# Patient Record
Sex: MALE | Race: AMERICAN INDIAN OR ALASKA NATIVE
[De-identification: names, ages, dates, MRNs, and addresses within clinical notes are randomized per-mention and may not be internally consistent; named-entity substitution may affect disease eponyms.]

---

## 2019-11-22 NOTE — XRAY REPORT
CHEST 2 VIEWS 



INDICATION: 

Cough and IAN.



COMPARISON: 

None.



FINDINGS:

Support devices: None.



Heart: Mild cardiomegaly. 

Lungs/Pleura: Localized consolidation right base.   No significant pleural effusion. 



IMPRESSION:  Right basilar pneumonia.



Signer Name: Iker Oquendo MD 

Signed: 11/22/2019 1:04 AM

 Workstation Name: [id number]

## 2019-11-22 NOTE — EMERGENCY DEPARTMENT REPORT
- General


Chief Complaint: Dyspnea/Respdistress


Stated Complaint: SOB


Time Seen by Provider: 19 00:46


Source: patient


Mode of arrival: Ambulatory


Limitations: No Limitations





- History of Present Illness


Initial Comments: 





This pleasant 44-year-old male present emergency department with a chief 

complaint of cough, congestion and shortness of breath the past 2 days.  Patient

reports a past medical history of hypertension but is not currently name 

medications.  He reports history of tobacco use and smokes about a pack a day 

for the past 10 years.  Patient reports he has had a productive cough and 

tightness in his chest as well as subjective fever at home.  Patient denies any 

dyspnea on exertion or chest pain on exertion, pleuritic pain, lower extremity 

edema, recent travel, sick contacts, nausea, vomiting, diarrhea, or any 

associated symptoms.  Patient denies any pain.


MD Complaint: fever, cough


Onset/Timing: 3


-: days(s)


Severity: mild


Severity scale (0 -10): 0


Consistency: intermittent


Improves With: nothing


Worsens With: activity


Associated Symptoms: denies other symptoms.  denies: myalgias, diaphoresis, 

headache, rhinorrhea, nasal congestion, sore throat, stiff neck, chest pain, 

abdominal pain, nausea, vomiting, diarrhea, dysuria, rash, confusion, right 

sweats, epistaxis, hoarseness


Treatments Prior to Arrival: none





- Related Data


                                  Previous Rx's











 Medication  Instructions  Recorded  Last Taken  Type


 


ALBUTEROL Inhaler (OR & NICU) 2 puff IH QID PRN #8.5 gram 19 Unknown Rx





[ProAir HFA Inhaler]    


 


levoFLOXacin [Levaquin] 750 mg PO QDAY #5 tablet 19 Unknown Rx


 


methylPREDNISolone [Medrol 4MG 4 mg PO ONCE #1 tab.ds.pk 19 Unknown Rx





DOSEPAK (21 tabs)]    











                                    Allergies











Allergy/AdvReac Type Severity Reaction Status Date / Time


 


No Known Allergies Allergy   Unverified 19 00:16














ED Review of Systems


ROS: 


Stated complaint: SOB


Other details as noted in HPI





Comment: All other systems reviewed and negative


Constitutional: see HPI, fever.  denies: chills


Eyes: denies: eye pain, eye discharge, vision change


ENT: denies: ear pain, throat pain


Respiratory: see HPI, cough, wheezing.  denies: orthopnea, shortness of breath, 

SOB with exertion, SOB at rest


Cardiovascular: denies: chest pain, palpitations


Endocrine: no symptoms reported


Gastrointestinal: denies: abdominal pain, nausea, diarrhea


Genitourinary: denies: urgency, dysuria


Musculoskeletal: denies: back pain, joint swelling, arthralgia


Skin: denies: rash, lesions


Neurological: denies: headache, weakness, paresthesias


Psychiatric: denies: anxiety, depression


Hematological/Lymphatic: denies: easy bleeding, easy bruising





ED Past Medical Hx





- Past Medical History


Previous Medical History?: Yes


Hx Hypertension: Yes





- Surgical History


Past Surgical History?: No





- Social History


Smoking Status: Current Every Day Smoker


Substance Use Type: None





- Medications


Home Medications: 


                                Home Medications











 Medication  Instructions  Recorded  Confirmed  Last Taken  Type


 


ALBUTEROL Inhaler (OR & NICU) 2 puff IH QID PRN #8.5 gram 19  Unknown Rx





[ProAir HFA Inhaler]     


 


levoFLOXacin [Levaquin] 750 mg PO QDAY #5 tablet 19  Unknown Rx


 


methylPREDNISolone [Medrol 4MG 4 mg PO ONCE #1 tab.ds.pk 19  Unknown Rx





DOSEPAK (21 tabs)]     














ED Physical Exam





- General


Limitations: No Limitations


General appearance: alert, in no apparent distress





- Head


Head exam: Present: atraumatic, normocephalic





- Eye


Eye exam: Present: normal appearance, PERRL, EOMI


Pupils: Present: normal accommodation





- ENT


ENT exam: Present: normal exam, normal orophraynx, mucous membranes moist, TM's 

normal bilaterally





- Neck


Neck exam: Present: normal inspection, full ROM.  Absent: tenderness, 

meningismus





- Respiratory


Respiratory exam: Present: normal lung sounds bilaterally, wheezes (mild 

expiratory wheezes bilaterally with no accessory muscle use or increased work of

breathing).  Absent: respiratory distress, rales, rhonchi, stridor





- Cardiovascular


Cardiovascular Exam: Present: regular rate, normal rhythm, normal heart sounds. 

Absent: systolic murmur, diastolic murmur, rubs, gallop





- GI/Abdominal


GI/Abdominal exam: Present: soft, normal bowel sounds.  Absent: tenderness, 

guarding, rebound, rigid





- Rectal


Rectal exam: Present: deferred





- Extremities Exam


Extremities exam: Present: normal inspection, full ROM, other (negative Homans 

sign bilaterally, no posterior calf tenderness or palpable cords.).  Absent: 

tenderness, calf tenderness





- Back Exam


Back exam: Present: normal inspection, full ROM.  Absent: tenderness, CVA 

tenderness (R), CVA tenderness (L)





- Neurological Exam


Neurological exam: Present: alert, oriented X3, normal gait.  Absent: motor 

sensory deficit





- Psychiatric


Psychiatric exam: Present: normal affect, normal mood





- Skin


Skin exam: Present: warm, dry, intact, normal color.  Absent: rash





ED Course





                                   Vital Signs











  19





  00:09


 


Temperature 98.2 F


 


Pulse Rate 78


 


Respiratory 20





Rate 


 


Blood Pressure 169/108





[Left] 


 


O2 Sat by Pulse 96





Oximetry 














ED Medical Decision Making





- Radiology Data


Radiology results: report reviewed, image reviewed





XRay Report


Signed





Patient: NATY OLIVAREZ MR#: [id number]





: 1975 Acct:[id number]





Age/Sex: 44 / M ADM Date: 19





Loc: ED


Attending Dr:








Ordering Physician: LATRICE AZPATA


Date of Service: 19


Procedure(s): XR chest routine 2V


Accession Number(s): [id number]





cc: LATRICE ZAPATA





Fluoro Time In Minutes:





CHEST 2 VIEWS





INDICATION:


Cough and IAN.





COMPARISON:


None.





FINDINGS:


Support devices: None.





Heart: Mild cardiomegaly.


Lungs/Pleura: Localized consolidation right base. No significant pleural 

effusion.





IMPRESSION: Right basilar pneumonia.





Signer Name: Iker Oquendo MD


Signed: 2019 1:04 AM


Workstation Name: [id number]








Transcribed By: ES


Dictated By: Iker Oquendo MD


Electronically Authenticated By: Iker Oquendo MD


Signed Date/Time: 19 0104








- Medical Decision Making





Patient is nontoxic in no acute distress.  Vitals are stable with a pulse ox of 

96 and no tachycardia.  Patient is PERC negative and low risk for PE by [facility] 

criteria.  He had wheezing on exam and was given a breathing treatment and 

steroid cream or department and his symptoms significantly improved.  Wheezing 

resolved after reevaluation.  X-ray was ordered and showed a right basilar 

pneumonia.  Patient is otherwise young and healthy and I will treat him with 

Levaquin 750 by mouth daily 5 days.  First dose was given in emergency 

department.  He was instructed to follow-up with his primary care doctor and 

return for spreading changing worsening symptoms.  Patient has no SIRS criteria 

and does not meet sepsis criteria.  He is well-appearing and agreeable with 

plan.  All questions were answered





- Differential Diagnosis


pneumonia, influenza, PE, viral respiratory infection, COPD exacerbation


Critical care attestation.: 


If time is entered above; I have spent that time in minutes in the direct care 

of this critically ill patient, excluding procedure time.








ED Disposition


Clinical Impression: 


CAP (community acquired pneumonia)


Qualifiers:


 Laterality: right Lung location: lower lobe of lung Qualified Code(s): J18.9 - 

Pneumonia, unspecified organism





Disposition:  TO HOME OR SELFCARE


Is pt being admited?: No


Does the pt Need Aspirin: No


Condition: Stable


Instructions:  Bacterial Pneumonia (ED)


Prescriptions: 


levoFLOXacin [Levaquin] 750 mg PO QDAY #5 tablet


methylPREDNISolone [Medrol 4MG DOSEPAK (21 tabs)] 4 mg PO ONCE #1 tab.ds.pk


ALBUTEROL Inhaler (OR & NICU) [ProAir HFA Inhaler] 2 puff IH QID PRN #8.5 gram


 PRN Reason: Shortness Of Breath


Referrals: 


PRIMARY CARE,MD [Primary Care Provider] - 3-5 Days


[facility] [Provider Group] - 3-5 Days


GUANAKO PALOMARES DO [Staff Physician] - 3-5 Days


Forms:  Work/School Release Form(ED)


Time of Disposition: 01:43

## 2019-11-30 NOTE — XRAY REPORT
CHEST 1 VIEW 



INDICATION / CLINICAL INFORMATION:

Dyspnea.



COMPARISON: 

None available.



FINDINGS:



SUPPORT DEVICES: None.



HEART / MEDIASTINUM: Mild cardiomegaly



LUNGS / PLEURA: Patchy bibasilar airspace density noted bilaterally. Small pleural effusions present.




Signer Name: Nicolas Giordano MD 

Signed: 11/30/2019 5:31 AM

 Workstation Name: [id number]

## 2019-11-30 NOTE — EMERGENCY DEPARTMENT REPORT
ED Shortness of Breath HPI





- General


Chief Complaint: Upper Respiratory Infection


Stated Complaint: COUGH IAN


Time Seen by Provider: 11/30/19 06:16


Source: patient


Mode of arrival: Ambulatory


Limitations: No Limitations





- History of Present Illness


Initial Comments: 


This is a 44-year-old man who denies previous history of hypertension.  I 

reviewed his previous vital signs upon his visit on 1120.  He had stage 2-3 

moderately severe hypertension.  He was treated for URI/questionable pneumonia. 

He states he's been short of breath throughout this time.  He has  dyspneia on 

exertion.  He does not complain of chest pain.  He has accelerated hypertension 

today.





MD Complaint: shortness of breath


-: Gradual


Severity: moderate (dyspnea denies chest)


Consistency: intermittent


Worsens With: lying flat


Context: recent URI


Associated Symptoms: denies other symptoms





- Related Data


                                Home Medications











 Medication  Instructions  Recorded  Confirmed  Last Taken


 


No Known Home Medications [No  11/30/19 11/30/19 Unknown





Reported Home Medications]    











                                    Allergies











Allergy/AdvReac Type Severity Reaction Status Date / Time


 


No Known Allergies Allergy   Unverified 11/22/19 00:16














ED Review of Systems


ROS: 


Stated complaint: COUGH IAN


Other details as noted in HPI





Constitutional: denies: chills, fever


Eyes: denies: eye pain, eye discharge, vision change


ENT: denies: ear pain, throat pain


Respiratory: shortness of breath.  denies: cough, wheezing


Cardiovascular: denies: chest pain, palpitations


Endocrine: no symptoms reported


Gastrointestinal: denies: abdominal pain, nausea, diarrhea


Genitourinary: denies: urgency, dysuria


Musculoskeletal: denies: back pain, joint swelling, arthralgia


Skin: denies: rash, lesions


Neurological: denies: headache, weakness, paresthesias


Psychiatric: denies: anxiety, depression


Hematological/Lymphatic: denies: easy bleeding, easy bruising





ED Past Medical Hx





- Past Medical History


Previous Medical History?: Yes


Hx Hypertension: Yes





- Surgical History


Past Surgical History?: No





- Social History


Smoking Status: Current Every Day Smoker





- Medications


Home Medications: 


                                Home Medications











 Medication  Instructions  Recorded  Confirmed  Last Taken  Type


 


No Known Home Medications [No  11/30/19 11/30/19 Unknown History





Reported Home Medications]     














ED Physical Exam





- General


Limitations: No Limitations


General appearance: alert, in no apparent distress





- Head


Head exam: Present: atraumatic, normocephalic





- Eye


Eye exam: Present: normal appearance.  Absent: scleral icterus





- ENT


ENT exam: Present: mucous membranes moist





- Neck


Neck exam: Present: normal inspection





- Respiratory


Respiratory exam: Present: rales (bilaterally).  Absent: respiratory distress





- Cardiovascular


Cardiovascular Exam: Present: regular rate, normal rhythm, S3, S4.  Absent: 

systolic murmur, diastolic murmur, rubs, gallop





- GI/Abdominal


GI/Abdominal exam: Present: soft, normal bowel sounds.  Absent: distended, 

tenderness, guarding, rebound, rigid





- Rectal


Rectal exam: Present: deferred





- Extremities Exam


Extremities exam: Present: other (1+ pretibial edema).  Absent: calf tenderness





- Back Exam


Back exam: Present: normal inspection





- Neurological Exam


Neurological exam: Present: alert, oriented X3, CN II-XII intact.  Absent: motor

sensory deficit





- Psychiatric


Psychiatric exam: Present: normal affect, normal mood





- Skin


Skin exam: Present: warm, dry, intact, normal color.  Absent: rash





ED Course


                                   Vital Signs











  11/30/19 11/30/19 11/30/19





  04:38 05:09 05:42


 


Temperature 97.8 F  98.3 F


 


Pulse Rate 87 84 85


 


Pulse Rate [   





Bilateral   





Throughout]   


 


Respiratory 20 23 16





Rate   


 


Respiratory   





Rate [Bilateral   





Throughout]   


 


Blood Pressure 186/124 174/118 


 


Blood Pressure   174/118





[Left]   


 


O2 Sat by Pulse 92 96 96





Oximetry   














  11/30/19 11/30/19 11/30/19





  05:55 06:00 06:31


 


Temperature   


 


Pulse Rate  79 84


 


Pulse Rate [ 85  





Bilateral   





Throughout]   


 


Respiratory  22 





Rate   


 


Respiratory 20  





Rate [Bilateral   





Throughout]   


 


Blood Pressure  172/121 157/116


 


Blood Pressure   





[Left]   


 


O2 Sat by Pulse  98 





Oximetry   














  11/30/19





  06:41


 


Temperature 


 


Pulse Rate 89


 


Pulse Rate [ 





Bilateral 





Throughout] 


 


Respiratory 





Rate 


 


Respiratory 





Rate [Bilateral 





Throughout] 


 


Blood Pressure 157/116


 


Blood Pressure 





[Left] 


 


O2 Sat by Pulse 





Oximetry 














ED Medical Decision Making





- Lab Data


Result diagrams: 


                                 11/30/19 05:35





                                 11/30/19 05:35





- EKG Data


-: EKG Interpreted by Me


EKG shows normal: sinus rhythm


Rate: normal





- EKG Data


Interpretation: nonspecific ST-T wave sunita, other (left axis deviation/LAFB and 

poor R-wave progression)





- Radiology Data


Radiology results: report reviewed (LUNGS / PLEURA: Patchy bibasilar airspace 

density noted bilaterally. Small pleural effusions), image reviewed


Critical care attestation.: 


If time is entered above; I have spent that time in minutes in the direct care 

of this critically ill patient, excluding procedure time.








ED Disposition


Clinical Impression: 


 Malignant hypertension, CHF (congestive heart failure)





Disposition: DC-09 OP ADMIT IP TO THIS HOSP


Is pt being admited?: Yes


Does the pt Need Aspirin: Yes


Condition: Stable


Instructions:  Hypertension (ED)


Referrals: 


PRIMARY CARE,MD [Primary Care Provider] - 3-5 Days


Time of Disposition: 07:31

## 2019-11-30 NOTE — HISTORY AND PHYSICAL REPORT
History of Present Illness


Date of examination: 11/30/19


Date of admission: 


11/30/19 07:37





Chief complaint: 


Shortness of breath on exertion for 4 days





History of present illness: 


44-year-old -American male with history of hypertension who is not taking

any medications comes in for increasing shortness of breath on exertion for last

3-4 days.  Patient was treated for questionable pneumonia and upper respiratory 

tract infection on 11/20/2019.  Patient was given antibiotics with no relief.  

In the emergency room patient's blood pressure was very high--186/124.Patient 

also has sob on minimal exertion and orthopnea.  Patient was diagnosed with high

blood pressure 10 months ago.  Patient very noncompliant and in denial about his

blood pressure.








Past Medical History


Previous Medical History?: Yes


Hx Hypertension: Yes





Surgical History


Past Surgical History?: No





Social History 


Smoking Status: Current Every Day Smoker 





Family history


Htn





Medications


Home Medications: 


                                Home Medications











 Medication  Instructions  Recorded  Confirmed  Last Taken  Type


 


No Known Home Medications [No  11/30/19 11/30/19 Unknown History





Reported Home Medications]     














Review of Systems


ROS: 


Stated complaint: COUGH IAN


Other details as noted in HPI





Constitutional: denies: chills, fever


Eyes: denies: eye pain, eye discharge, vision change


ENT: denies: ear pain, throat pain


Respiratory: shortness of breath.  denies: cough, wheezing


Cardiovascular: denies: chest pain, palpitations


Endocrine: no symptoms reported


Gastrointestinal: denies: abdominal pain, nausea, diarrhea


Genitourinary: denies: urgency, dysuria


Musculoskeletal: denies: back pain, joint swelling, arthralgia


Skin: denies: rash, lesions


Neurological: denies: headache, weakness, paresthesias


Psychiatric: denies: anxiety, depression


Hematological/Lymphatic: denies: easy bleeding, easy bruising

















Medications and Allergies


                                    Allergies











Allergy/AdvReac Type Severity Reaction Status Date / Time


 


No Known Allergies Allergy   Verified 11/30/19 12:30











                                Home Medications











 Medication  Instructions  Recorded  Confirmed  Last Taken  Type


 


Potassium Chloride [K-Dur] 10 meq PO QDAY #30 tablet 11/30/19  Unknown Rx


 


Valsartan [Diovan] 160 mg PO BID #60 tablet 11/30/19  Unknown Rx


 


carvediloL [Coreg] 6.25 mg PO BID #60 tablet 11/30/19  Unknown Rx


 


hydroCHLOROthiazide [HCTZ] 25 mg PO QDAY #30 tablet 11/30/19  Unknown Rx














Exam





- Constitutional


Vitals: 


                                        











Temp Pulse Resp BP Pulse Ox


 


 98.0 F   88   16   159/99   96 


 


 11/30/19 11:14  11/30/19 11:14  11/30/19 11:14  11/30/19 11:14  11/30/19 11:14











General appearance: Present: no acute distress, well-nourished





- EENT


Eyes: Present: PERRL


ENT: hearing intact, clear oral mucosa





- Neck


Neck: Present: supple, normal ROM





- Respiratory


Respiratory effort: normal


Respiratory: bilateral: CTA





- Cardiovascular


Heart rate: 86


Rhythm: regular


Heart Sounds: Present: S1 & S2.  Absent: rub, click





- Extremities


Extremities: no ischemia, pulses intact, pulses symmetrical, No edema


Peripheral Pulses: within normal limits





- Abdominal


General gastrointestinal: Present: soft, non-tender, non-distended, normal bowel

 sounds


Male genitourinary: Present: normal





- Rectal


Rectal Exam: deferred





- Integumentary


Integumentary: Present: clear, warm, dry





- Musculoskeletal


Musculoskeletal: gait normal, strength equal bilaterally





- Psychiatric


Psychiatric: appropriate mood/affect, intact judgment & insight





- Neurologic


Neurologic: CNII-XII intact, moves all extremities





Results





- Labs


CBC & Chem 7: 


                                 11/30/19 05:35





                                 11/30/19 05:35


Labs: 


                             Laboratory Last Values











WBC  6.8 K/mm3 (4.5-11.0)   11/30/19  05:35    


 


RBC  4.62 M/mm3 (3.65-5.03)   11/30/19  05:35    


 


Hgb  13.3 gm/dl (11.8-15.2)   11/30/19  05:35    


 


Hct  39.6 % (35.5-45.6)   11/30/19  05:35    


 


MCV  86 fl (84-94)   11/30/19  05:35    


 


MCH  29 pg (28-32)   11/30/19  05:35    


 


MCHC  34 % (32-34)   11/30/19  05:35    


 


RDW  15.1 % (13.2-15.2)   11/30/19  05:35    


 


Plt Count  230 K/mm3 (140-440)   11/30/19  05:35    


 


Lymph % (Auto)  24.1 % (13.4-35.0)   11/30/19  05:35    


 


Mono % (Auto)  6.7 % (0.0-7.3)   11/30/19  05:35    


 


Eos % (Auto)  3.1 % (0.0-4.3)   11/30/19  05:35    


 


Baso % (Auto)  0.5 % (0.0-1.8)   11/30/19  05:35    


 


Lymph #  1.6 K/mm3 (1.2-5.4)   11/30/19  05:35    


 


Mono #  0.5 K/mm3 (0.0-0.8)   11/30/19  05:35    


 


Eos #  0.2 K/mm3 (0.0-0.4)   11/30/19  05:35    


 


Baso #  0.0 K/mm3 (0.0-0.1)   11/30/19  05:35    


 


Seg Neutrophils %  65.6 % (40.0-70.0)   11/30/19  05:35    


 


Seg Neutrophils #  4.4 K/mm3 (1.8-7.7)   11/30/19  05:35    


 


PT  13.2 Sec. (12.2-14.9)   11/30/19  06:35    


 


INR  1.01  (0.87-1.13)   11/30/19  06:35    


 


APTT  30.7 Sec. (24.2-36.6)   11/30/19  06:35    


 


Sodium  142 mmol/L (137-145)   11/30/19  05:35    


 


Potassium  3.6 mmol/L (3.6-5.0)   11/30/19  05:35    


 


Chloride  106.3 mmol/L ()   11/30/19  05:35    


 


Carbon Dioxide  26 mmol/L (22-30)   11/30/19  05:35    


 


Anion Gap  13 mmol/L  11/30/19  05:35    


 


BUN  20 mg/dL (9-20)   11/30/19  05:35    


 


Creatinine  1.0 mg/dL (0.8-1.5)   11/30/19  05:35    


 


Estimated GFR  > 60 ml/min  11/30/19  05:35    


 


BUN/Creatinine Ratio  20 %  11/30/19  05:35    


 


Glucose  163 mg/dL ()  H  11/30/19  05:35    


 


Calcium  8.5 mg/dL (8.4-10.2)   11/30/19  05:35    


 


Total Bilirubin  0.40 mg/dL (0.1-1.2)   11/30/19  05:35    


 


AST  26 units/L (5-40)   11/30/19  05:35    


 


ALT  49 units/L (7-56)   11/30/19  05:35    


 


Alkaline Phosphatase  55 units/L ()   11/30/19  05:35    


 


Troponin T  0.015 ng/mL (0.00-0.029)   11/30/19  05:35    


 


NT-Pro-B Natriuret Pep  1055 pg/mL (0-450)  H  11/30/19  06:35    


 


Total Protein  6.2 g/dL (6.3-8.2)  L  11/30/19  05:35    


 


Albumin  3.8 g/dL (3.9-5)  L  11/30/19  05:35    


 


Albumin/Globulin Ratio  1.6 %  11/30/19  05:35    


 


Urine Color  Yellow  (Yellow)   11/30/19  08:16    


 


Urine Turbidity  Clear  (Clear)   11/30/19  08:16    


 


Urine pH  6.0  (5.0-7.0)   11/30/19  08:16    


 


Ur Specific Gravity  1.014  (1.003-1.030)   11/30/19  08:16    


 


Urine Protein  <15 mg/dl mg/dL (Negative)   11/30/19  08:16    


 


Urine Glucose (UA)  Neg mg/dL (Negative)   11/30/19  08:16    


 


Urine Ketones  Neg mg/dL (Negative)   11/30/19  08:16    


 


Urine Blood  Neg  (Negative)   11/30/19  08:16    


 


Urine Nitrite  Neg  (Negative)   11/30/19  08:16    


 


Urine Bilirubin  Neg  (Negative)   11/30/19  08:16    


 


Urine Urobilinogen  < 2.0 mg/dL (<2.0)   11/30/19  08:16    


 


Ur Leukocyte Esterase  Neg  (Negative)   11/30/19  08:16    


 


Urine WBC (Auto)  0.0 /HPF (0.0-6.0)   11/30/19  08:16    


 


Urine RBC (Auto)  1.0 /HPF (0.0-6.0)   11/30/19  08:16    


 


U Epithel Cells (Auto)  < 1.0 /HPF (0-13.0)   11/30/19  08:16    


 


Urine Opiates Screen  Presumptive negative   11/30/19  08:16    


 


Urine Methadone Screen  Presumptive negative   11/30/19  08:16    


 


Ur Barbiturates Screen  Presumptive negative   11/30/19  08:16    


 


Ur Phencyclidine Scrn  Presumptive negative   11/30/19  08:16    


 


Ur Amphetamines Screen  Presumptive negative   11/30/19  08:16    


 


U Benzodiazepines Scrn  Presumptive negative   11/30/19  08:16    


 


Urine Cocaine Screen  Presumptive negative   11/30/19  08:16    


 


U Marijuana (THC) Screen  Presumptive negative   11/30/19  08:16    


 


Drugs of Abuse Note  Disclamer   11/30/19  08:16    








                                    Short CBC











  11/30/19 Range/Units





  05:35 


 


WBC  6.8  (4.5-11.0)  K/mm3


 


Hgb  13.3  (11.8-15.2)  gm/dl


 


Hct  39.6  (35.5-45.6)  %


 


Plt Count  230  (140-440)  K/mm3








                                       BMP











  11/30/19





  05:35


 


Sodium  142


 


Potassium  3.6


 


Chloride  106.3


 


Carbon Dioxide  26


 


BUN  20


 


Creatinine  1.0


 


Glucose  163 H


 


Calcium  8.5








                                 Cardiac Enzymes











  11/30/19 Range/Units





  05:35 


 


Troponin T  0.015  (0.00-0.029)  ng/mL








                                 Liver Function











  11/30/19 Range/Units





  05:35 


 


Total Bilirubin  0.40  (0.1-1.2)  mg/dL


 


AST  26  (5-40)  units/L


 


ALT  49  (7-56)  units/L


 


Alkaline Phosphatase  55  ()  units/L


 


Albumin  3.8 L  (3.9-5)  g/dL








                                      Urine











  11/30/19 Range/Units





  08:16 


 


Urine Color  Yellow  (Yellow)  


 


Urine pH  6.0  (5.0-7.0)  


 


Ur Specific Gravity  1.014  (1.003-1.030)  


 


Urine Protein  <15 mg/dl  (Negative)  mg/dL


 


Urine Glucose (UA)  Neg  (Negative)  mg/dL














- Imaging and Cardiology


EKG: report reviewed ([facility])


Chest x-ray: report reviewed


Imaging and Cardiology: 





Chest x-ray





LUNGS / PLEURA: Patchy bibasilar airspace density noted bilaterally. Small 

pleural effusions


present.














Assessment and Plan


Advance Directives: Yes (Full code)


Plan of care discussed with patient/family: Yes





- Patient Problems


(1) Hypertensive emergency


Status: Acute   


Plan to address problem: 


Patient started on Valsartan coreg and IV Hydralazine 10 mg q3 prn irritable








(2) CHF exacerbation


Status: Acute   


Qualifiers: 


   Heart failure type: combined systolic and diastolic   Qualified Code(s): 

I50.43 - Acute on chronic combined systolic (congestive) and diastolic (conge

stive) heart failure   


Plan to address problem: 


ECHO ordered for EF


Clinically history and exam c/w  CHF 








(3) Nicotine dependence


Status: Chronic   


Qualifiers: 


   Nicotine product type: cigarettes 


Plan to address problem: 


Counselled about stopping smoking


Nicoderm patch initiated








(4) DVT prophylaxis


Status: Acute   


Plan to address problem: 


On Heparin 5000 q 12

## 2019-12-01 NOTE — DISCHARGE SUMMARY
Providers





- Providers


Date of Admission: 


11/30/19 07:37





Date of discharge: 11/30/19


Attending physician: 


AZUCENA SÁNCHEZ





Primary care physician: 


PRIMARY CARE MD








Hospitalization


Condition: Stable


Pertinent studies: 


Echocardiogram--systolic heart failure


Ejection fraction of 20-25%





Hospital course: 


(1) Hypertensive emergency


Status: Acute   


Plan to address problem: 


Patient started on Valsartan coreg and IV Hydralazine 10 mg q3 prn irritable








(2) CHF exacerbation


Status: Acute   


Qualifiers: 


   Heart failure type: combined systolic and diastolic   Qualified Code(s): 

I50.43 - Acute on chronic combined systolic (congestive) and diastolic 

(congestive) heart failure   


Plan to address problem: 


ECHO ordered for EF


Clinically history and exam c/w  CHF 








(3) Nicotine dependence


Status: Chronic   


Qualifiers: 


   Nicotine product type: cigarettes 


Plan to address problem: 


Counselled about stopping smoking


Nicoderm patch initiated








(4) DVT prophylaxis


Status: Acute   


Plan to address problem: 


On Heparin 5000 q 12 





Patient signed out AMA before I could give him his echocardiogram results.


Patient to follow at [facility]


Prescriptions for valsartan and Coreg given











Disposition: DC-07 LEFT AGAINST MED ADVICE





- Discharge Diagnoses


(1) Hypertensive emergency


Status: Acute   





(2) CHF exacerbation


Status: Acute   


Qualifiers: 


   Heart failure type: combined systolic and diastolic   Qualified Code(s): 

I50.43 - Acute on chronic combined systolic (congestive) and diastolic 

(congestive) heart failure   





(3) Nicotine dependence


Status: Chronic   


Qualifiers: 


   Nicotine product type: cigarettes 





(4) DVT prophylaxis


Status: Acute   





Core Measure Documentation





- Palliative Care


Palliative Care/ Comfort Measures: Not Applicable





- Core Measures


Any of the following diagnoses?: none





Exam





- Constitutional


Vitals: 


                                        











Temp Pulse Resp BP Pulse Ox


 


 99.3 F   80   24   157/101   96 


 


 11/30/19 11:37  11/30/19 16:22  11/30/19 11:37  11/30/19 16:22  11/30/19 11:37











General appearance: Present: no acute distress, well-nourished





- EENT


Eyes: Present: PERRL


ENT: hearing intact, clear oral mucosa





- Neck


Neck: Present: supple, normal ROM





- Respiratory


Respiratory effort: normal


Respiratory: bilateral: CTA





- Cardiovascular


Heart rate: 78


Rhythm: regular


Heart Sounds: Present: S1 & S2.  Absent: rub, click





- Extremities


Extremities: no ischemia, pulses intact, pulses symmetrical, No edema


Peripheral Pulses: within normal limits





- Abdominal


General gastrointestinal: Present: soft, non-tender, non-distended, normal bowel

sounds


Male genitourinary: Present: normal





- Rectal


Rectal Exam: deferred





- Integumentary


Integumentary: Present: clear, warm, dry





- Musculoskeletal


Musculoskeletal: gait normal, strength equal bilaterally





- Psychiatric


Psychiatric: appropriate mood/affect, intact judgment & insight





- Neurologic


Neurologic: CNII-XII intact, moves all extremities





- Allied Health


Allied health notes reviewed: nursing, case management





Plan


Activity: no restrictions


Diet: low fat, low cholesterol, low salt


Follow up with: 


PRIMARY CARE,MD [Primary Care Provider] - 3-5 Days


NICKOLAS RM MD [Staff Physician] - 7 Days


Prescriptions: 


carvediloL [Coreg] 6.25 mg PO BID #60 tablet


Valsartan [Diovan] 160 mg PO BID #60 tablet


hydroCHLOROthiazide [HCTZ] 25 mg PO QDAY #30 tablet


Potassium Chloride [K-Dur] 10 meq PO QDAY #30 tablet

## 2020-02-26 NOTE — XRAY REPORT
CHEST 2 VIEWS 



INDICATION / CLINICAL INFORMATION:

sob.



COMPARISON: 

One view of the chest from 11/30/2019.



FINDINGS:



SUPPORT DEVICES: None.

HEART / MEDIASTINUM: Cardiomegaly has worsened. 

LUNGS / PLEURA: Bilateral interstitial opacities likely represent edema/atelectasis. No significant p
leural effusion. No pneumothorax. 



ADDITIONAL FINDINGS: No significant additional findings.



IMPRESSION:

Interval worsening of cardia megaly with probable pulmonary edema/atelectasis.



Signer Name: Jose Guerra MD 

Signed: 2/26/2020 7:30 PM

 Workstation Name: [id number]

## 2020-02-26 NOTE — EMERGENCY DEPARTMENT REPORT
Minor Respiratory





- HPI


Duration: 4 Days


Pain Location: Other (Cough and shortness of breath)


Severity: moderate


Minor Respiratory: Yes Cough, Yes Shortness of Breath, No Rhinorrhea, No Sore 

Throat, No Able to Tolerate Fluids, No Ear Pain, No Sick Contacts, No 

Hemoptysis, No Chest Pain, No Fever


Other History: This is a 45-year-old -American male who presents to the 

emergency room with shortness of breath and a cough for 3 to 4 days.  Patient 

states he is currently not taking anything for symptomatic relief.  He reports 

shortness of breath is occasional.  Past medical history of hypertension.  

Current smoker.  He also reports myalgia with associated symptoms.  He denies 

nausea, vomiting, diarrhea, palpitations, weakness, headache.





<RON BECERRA - Last Filed: 02/27/20 00:42>





<FILEMON FERNANDEZ III - Last Filed: 02/27/20 01:07>





- HPI


Chief Complaint: Upper Respiratory Infection


Stated Complaint: CHEST PAIN


Time Seen by Provider: 02/26/20 21:58





ED Review of Systems


ROS: 


Stated complaint: CHEST PAIN


Other details as noted in HPI





Constitutional: denies: chills, fever


Respiratory: cough, SOB with exertion.  denies: shortness of breath, wheezing


Cardiovascular: denies: chest pain, palpitations


Gastrointestinal: denies: abdominal pain, nausea, diarrhea


Musculoskeletal: myalgia.  denies: back pain, joint swelling, arthralgia


Skin: denies: rash, lesions


Neurological: denies: headache, weakness, paresthesias


Psychiatric: denies: anxiety, depression





<RON BECERRA - Last Filed: 02/27/20 00:42>


ROS: 


Stated complaint: CHEST PAIN


Other details as noted in HPI








<FILEMON FERNANDEZ III - Last Filed: 02/27/20 01:07>





ED Past Medical Hx





- Past Medical History


Previous Medical History?: Yes


Hx Hypertension: Yes


Hx Asthma: No





- Social History


Smoking Status: Current Every Day Smoker


Substance Use Type: None





<RON BECERRA - Last Filed: 02/27/20 00:42>





<FILEMON FERNANDEZ III - Last Filed: 02/27/20 01:07>





- Medications


Home Medications: 


                                Home Medications











 Medication  Instructions  Recorded  Confirmed  Last Taken  Type


 


Valsartan [Diovan] 160 mg PO BID #60 tablet 11/30/19  Unknown Rx


 


carvediloL [Coreg] 6.25 mg PO BID #60 tablet 11/30/19  Unknown Rx


 


hydroCHLOROthiazide [HCTZ] 25 mg PO QDAY #30 tablet 11/30/19  Unknown Rx














Minor Respiratory Exam





- Exam


General: 


Vital signs noted. No distress. Alert and acting appropriately.





HEENT: Yes Moist Mucous Membranes, No Pharyngeal Erythema, No Pharyngeal Ex

udates, No Rhinorrhea, No Conjuctival Injection, No Frontal Tenderness, No 

Maxillary Tenderness


Ear: Neither TM Bulge, Neither TM Erythema, Neither EAC Pain, Neither EAC Disch

arge


Neck: Yes Supple, No Adenopathy


Lungs: Yes Cough, Yes Other Abnormal Lung Sounds (Diminished throughout ), No 

Good Air Exchange, No Wheezes, No Ronchi, No Stridor, No Labored Respirations, 

No Retractions, No Use of Accessory Muscles


Heart: Yes Regular, No Murmur


Abdomen: Yes Normal Bowel Sounds, No Tenderness, No Peritoneal Signs


Skin: No Rash, No Edema


Neurologic: 


Alert and oriented, no deficits.








Musculoskeletal: 


Unremarkable.











<RON BECERRA - Last Filed: 02/27/20 00:42>





- Exam


General: 


Vital signs noted. No distress. Alert and acting appropriately.





Neurologic: 


Alert and oriented, no deficits.








Musculoskeletal: 


Unremarkable.











<FILEMON FERNANDEZ III - Last Filed: 02/27/20 01:07>





ED Course


                                   Vital Signs











  02/26/20





  18:59


 


Temperature 98.3 F


 


Pulse Rate 97 H


 


Respiratory 20





Rate 


 


Blood Pressure 170/105


 


O2 Sat by Pulse 94





Oximetry 














- Reevaluation(s)


Reevaluation #2: 





02/27/20 00:15


Paged hospitalist Dr. Carvalho, admission for CHF exacerbation


02/27/20 00:42


Consulted hospitalist Dr. Carvalho who agrees to admit patient for CHF 

exacerbation.


02/27/20 00:44








<RON BECERRA - Last Filed: 02/27/20 00:42>


                                   Vital Signs











  02/26/20





  18:59


 


Temperature 98.3 F


 


Pulse Rate 97 H


 


Respiratory 20





Rate 


 


Blood Pressure 170/105


 


O2 Sat by Pulse 94





Oximetry 














- Reevaluation(s)


Reevaluation #1: 


I discussed clinical findings with the patient.  I discussed plan of care with 

patient.  I examined the patient.  The patient has decreased lung sounds 

bilaterally.  Patient also has significant dyspnea on exertion.  Patient 

ambulates minimally and is out of breath.


02/26/20 23:41








I discussed all results with patient.  I discussed plan of care with patient.  

Patient agrees with plan of care and admission.  Patient to be admitted to the 

hospitalist service.


02/27/20 00:28








<FILEMON FERNANDEZ III - Last Filed: 02/27/20 01:07>





ED Medical Decision Making





- Lab Data


Result diagrams: 


                                 02/26/20 22:49





                                 02/26/20 22:49





                                   Lab Results











  02/26/20 02/26/20 Range/Units





  22:49 22:49 


 


WBC  6.9   (4.5-11.0)  K/mm3


 


RBC  4.87   (3.65-5.03)  M/mm3


 


Hgb  13.5   (11.8-15.2)  gm/dl


 


Hct  40.2   (35.5-45.6)  %


 


MCV  83 L   (84-94)  fl


 


MCH  28   (28-32)  pg


 


MCHC  33   (32-34)  %


 


RDW  15.2   (13.2-15.2)  %


 


Plt Count  239   (140-440)  K/mm3


 


Lymph % (Auto)  28.9   (13.4-35.0)  %


 


Mono % (Auto)  7.5 H   (0.0-7.3)  %


 


Eos % (Auto)  4.9 H   (0.0-4.3)  %


 


Baso % (Auto)  0.9   (0.0-1.8)  %


 


Lymph #  2.0   (1.2-5.4)  K/mm3


 


Mono #  0.5   (0.0-0.8)  K/mm3


 


Eos #  0.3   (0.0-0.4)  K/mm3


 


Baso #  0.1   (0.0-0.1)  K/mm3


 


Seg Neutrophils %  57.8   (40.0-70.0)  %


 


Seg Neutrophils #  4.0   (1.8-7.7)  K/mm3


 


Sodium   147 H  (137-145)  mmol/L


 


Potassium   4.1  (3.6-5.0)  mmol/L


 


Chloride   106.4  ()  mmol/L


 


Carbon Dioxide   26  (22-30)  mmol/L


 


Anion Gap   19  mmol/L


 


BUN   19  (9-20)  mg/dL


 


Creatinine   1.2  (0.8-1.5)  mg/dL


 


Estimated GFR   > 60  ml/min


 


BUN/Creatinine Ratio   16  %


 


Glucose   156 H  ()  mg/dL


 


Calcium   9.2  (8.4-10.2)  mg/dL


 


Troponin T   < 0.010  (0.00-0.029)  ng/mL


 


NT-Pro-B Natriuret Pep   611.6 H  (0-450)  pg/mL














- Radiology Data


Radiology results: report reviewed





CHEST 2 VIEWS 





INDICATION / CLINICAL INFORMATION: 


sob. 





COMPARISON: 


One view of the chest from 11/30/2019. 





FINDINGS: 





SUPPORT DEVICES: None. 


HEART / MEDIASTINUM: Cardiomegaly has worsened. 


LUNGS / PLEURA: Bilateral interstitial opacities likely represent edema/a

telectasis. No significant


 pleural effusion. No pneumothorax. 





ADDITIONAL FINDINGS: No significant additional findings. 





IMPRESSION: 


Interval worsening of cardia megaly with probable pulmonary edema/atelectasis. 





- Medical Decision Making





This is a 45-year-old male who presents to the emergency room with cough and 

shortness of breath for 3 to 4 days.  Past medical history of hypertension.  

Patient is stable and in no acute distress.  Work-up: Chest x-ray.  Chest x-ray 

findings of Interval worsening of cardia megaly with probable pulmonary 

edema/atelectasis.  Consulted attending Dr. Fernandez regarding chest x-ray.  

Labs ordered.  BNP elevated.  Consulted hospitalist Dr. Carvalho who agrees to 

admit patient for CHF exacerbation.  Patient informed of plan to admit due to 

acute exacerbation of CHF.  Patient agrees with ER plan.





<RON BECERRA - Last Filed: 02/27/20 00:42>





- Lab Data


Result diagrams: 


                                 02/26/20 22:49





                                 02/26/20 22:49





- EKG Data


-: EKG Interpreted by Me


EKG shows normal: sinus rhythm, intervals, QRS complexes, ST-T waves


Rate: normal





- EKG Data


Interpretation: [facility], other (axis deviation)





<FILEMON FERNANDEZ III - Last Filed: 02/27/20 01:07>


Critical care attestation.: 


If time is entered above; I have spent that time in minutes in the direct care 

of this critically ill patient, excluding procedure time.








<RON BECERRA - Last Filed: 02/27/20 00:42>


Critical Care Time: Yes


Critical care time in (mins) excluding proc time.: 35


Critical care attestation.: 


If time is entered above; I have spent that time in minutes in the direct care 

of this critically ill patient, excluding procedure time.





Critical Care Time: 





35 minutes





<FILEMON FERNANDEZ III - Last Filed: 02/27/20 01:07>





ED Disposition





<RON BECERRA - Last Filed: 02/27/20 00:42>


Is pt being admited?: Yes


Does the pt Need Aspirin: No


Time of Disposition: 00:30





<FILEMON FERNANDEZ III - Last Filed: 02/27/20 01:07>


Clinical Impression: 


 Shortness of breath, TREADWELL (dyspnea on exertion), Cough





CHF exacerbation


Qualifiers:


 Heart failure type: unspecified Qualified Code(s): I50.9 - Heart failure, 

unspecified





Pulmonary edema


Qualifiers:


 Chronicity: acute Qualified Code(s): J81.0 - Acute pulmonary edema





Disposition: DC-09 OP ADMIT IP TO THIS HOSP


Condition: Critical

## 2020-02-27 NOTE — EVENT NOTE
Date: 02/27/20


Patient presents with SOB, diagnosed with CHF exacerbation. I have seen and 

examined him.
ED Screening Note


Date of service: 02/26/20


Time: 18:56


ED Screening Note: 


45 y o male presents to Ed cc of SOB worse with laying down


 intermittent coughing


no hx of COPD or asthma








This initial assessment/diagnostic orders/clinical plan/treatment(s) is/are 

subject to change based on patients health status, clinical progression and re-

assessment by fellow clinical providers in the ED. Further treatment and workup 

at subsequent clinical providers discretion. Patient/guardian urged not to elope

from the ED as their condition may be serious if not clinically assessed and 

managed. 





Initial orders include: 


cxr
Ambulatory

## 2020-02-27 NOTE — HISTORY AND PHYSICAL REPORT
History of Present Illness


History of present illness: 


45-year-old man with a history of hypertension, CHF with EF of 25 to 30%, 

obesity comes emergency room with complaints of shortness of breath x2 days, 

orthopnea, dyspnea on exertion.  He denies any PND.  Admits to drinking a gallon

of water a day or more, stated he is compliant with his medications.  The 

patient was diagnosed with CHF in November of last year.  He denies any chest 

pain, patient will be admitted for CHF exacerbation


Review Of  Systems:


Constitutional: no weight loss, fever, chills


Ears, eyes, nose, mouth and throat: no nasal congestion, no nasal discharge, no 

sinus pressure, blurry vision, diplopia


Neck: No neck pain or rigidity.


Cardiovascular: No  palpitations, chest pain


Respiratory: No  cough


Gastrointestinal: No hematochezia, abdominal pain


Genitourinary : no dysuria, frequency


Musculoskeletal: no muscle ache , joint pain


Integumentary: no rash, no pruritis


Neurological: no parathesias, focal weakness


Endocrine: no cold or heat intolerance, no polyuria or polydipsia


Hematologic/Lymphatic: no easy bruising, no easy bleeding, no gland swelling


Allergic/Immunologic: no urticaria, no angioedema.





PAST MEDICAL HISTORY: hypertension, CHF, obesity





PAST SURGICAL HISTORY: None





SOCIAL HISTORY: Social alcohol, smokes half pack a day, no drugs





FAMILY HISTORY: Hypertension














Medications and Allergies


                                    Allergies











Allergy/AdvReac Type Severity Reaction Status Date / Time


 


No Known Allergies Allergy   Verified 11/30/19 12:30











                                Home Medications











 Medication  Instructions  Recorded  Confirmed  Last Taken  Type


 


Valsartan [Diovan] 160 mg PO BID #60 tablet 11/30/19  Unknown Rx


 


carvediloL [Coreg] 6.25 mg PO BID #60 tablet 11/30/19  Unknown Rx


 


hydroCHLOROthiazide [HCTZ] 25 mg PO QDAY #30 tablet 11/30/19  Unknown Rx














Exam





- Physical Exam


Narrative exam: 


Gen. appearance: Patient lying in bed, no apparent distress


HEENT: Normocephalic, atraumatic, pupils equally round and reactive to light, 

extraocular movement intact, and no sclericterus,. No JVD or thyromegaly or 

nodule,neck supple, no carotid bruit ,mucous membranes moist, no exudate or 

erythema


Heart: S1, S2, regular rate and rhythm


Lungs: Crackles bilaterally, breathing comfortable


Abdomen: Positive bowel sounds, nontender, nondistended, no organomegaly


Extremity: no edema, cyanosis, clubbing


Skin: No rash, nodules, warm, dry


Neuro: speech is fluent, cranial nerves II to XII intact, motor and sensory 

intact











- Constitutional


Vitals: 


                                        











Temp Pulse Resp BP Pulse Ox


 


 98.2 F   84   18   166/104   96 


 


 02/27/20 00:20  02/27/20 00:20  02/27/20 00:20  02/27/20 00:20  02/27/20 00:20














Results





- Labs


CBC & Chem 7: 


                                 02/26/20 22:49





                                 02/26/20 22:49


Labs: 


                              Abnormal lab results











  02/26/20 02/26/20 Range/Units





  22:49 22:49 


 


MCV  83 L   (84-94)  fl


 


Mono % (Auto)  7.5 H   (0.0-7.3)  %


 


Eos % (Auto)  4.9 H   (0.0-4.3)  %


 


Sodium   147 H  (137-145)  mmol/L


 


Glucose   156 H  ()  mg/dL


 


NT-Pro-B Natriuret Pep   611.6 H  (0-450)  pg/mL














- Imaging and Cardiology


EKG: image reviewed


Chest x-ray: report reviewed





Assessment and Plan


Assessment


Mild CHF exacerbation, acute on chronic systolic


Diuresed with IV Lasix


Continue beta-blocker, ARB, aspirin, consult cardiology


Will not repeat echocardiogram, one was done in November


Check cardiac enzymes, monitor I's and O's, daily weights


Dietary consult for education





Hypertension, uncontrolled


Continue antihypertensive, add IV hydralazine for blood pressure control





Hypernatremia


Continue to monitor





DVT prophylaxis

## 2020-02-28 NOTE — PROGRESS NOTE
Assessment and Plan





Acute systolic heart failure


Non-ischemic cardiomyopathy


   MPI this admission showing no ischemia, severe LVE, LVEF 20%


Essential primary hypertension


Morbid obesity


Non-compliance


Nicotine dependence





Tele showing sinus rhythm with no arrhythmias





Recommendations:





Continue GDMT


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 











Subjective


Date of service: 02/28/20


Principal diagnosis: CHF


Interval history: 





Patient underwent lexiscan today without complications





Objective


                                   Vital Signs











  Temp Pulse Pulse Resp BP BP Pulse Ox


 


 02/28/20 11:54  98.0 F  91 H   18  136/93   93


 


 02/28/20 11:11   87    153/101  


 


 02/28/20 10:02      147/95  


 


 02/28/20 10:00      152/88  


 


 02/28/20 09:57      161/102  


 


 02/28/20 09:32      154/101  


 


 02/28/20 04:56  98.1 F  82   20  156/103   98


 


 02/28/20 03:00   82     


 


 02/28/20 00:49  97.9 F  82   18  170/124   95


 


 02/28/20 00:00   81     165/115 


 


 02/27/20 22:55   79    178/125  


 


 02/27/20 22:50   90    173/121  


 


 02/27/20 22:04        98


 


 02/27/20 20:39  97.7 F  90   16  173/121   95


 


 02/27/20 20:00    90  18    95


 


 02/27/20 19:00   90     


 


 02/27/20 16:49  98.6 F  86   18  141/99   96


 


 02/27/20 13:09  98.3 F  84   18   170/110  95














- Physical Examination


HEENT: Positive: PERRL


Neck: Positive: neck supple


Cardiac: Positive: Reg Rate and Rhythm


Lungs: Positive: Normal Exam


Neuro: Positive: Grossly Intact


Abdomen: Positive: Soft


Skin: Positive: Clear


Extremities: Absent: edema





- Imaging and Cardiology


EKG: image reviewed

## 2020-02-28 NOTE — PROGRESS NOTE
Assessment and Plan


Assessment and plan: 


Mild CHF exacerbation, acute on chronic systolic


Diuresed with IV Lasix


Continue beta-blocker, ARB, aspirin, consult cardiology


Will not repeat echocardiogram, one was done in November


Check cardiac enzymes, monitor I's and O's, daily weights


Dietary consult for education





Hypertension, uncontrolled


Continue antihypertensive, add IV hydralazine for blood pressure control





Hypernatremia


Continue to monitor





DVT prophylaxis








History


Interval history: 


Less shortness of breath








Hospitalist Physical





- Physical exam


Narrative exam: 


GEN: Not in acute distress, lying in bed, normal bowel sounds


HEENT: Normocephalic, atraumatic, 


Neck: supple, No JVD


Lungs: Clear to auscultation ,no wheeze,


heart;S1 and S2 reg, no murmurs, rubs or gallop


Abd:soft, non tender, non distended,  normal bowel sounds


Ext: No edema, no clubbing, no cyanosis


Neuro: Awake,alert, oriented X 3, moves all ext











- Constitutional


Vitals: 


                                        











Temp Pulse Resp BP Pulse Ox


 


 98.0 F   91 H  18   136/93   93 


 


 02/28/20 11:54  02/28/20 11:54  02/28/20 11:54  02/28/20 11:54  02/28/20 11:54











General appearance: Present: obese





Results





- Labs


CBC & Chem 7: 


                                 02/27/20 05:47





                                 02/27/20 05:47


Labs: 


                             Laboratory Last Values











WBC  5.8 K/mm3 (4.5-11.0)   02/27/20  05:47    


 


RBC  5.10 M/mm3 (3.65-5.03)  H  02/27/20  05:47    


 


Hgb  14.1 gm/dl (11.8-15.2)   02/27/20  05:47    


 


Hct  41.8 % (35.5-45.6)   02/27/20  05:47    


 


MCV  82 fl (84-94)  L  02/27/20  05:47    


 


MCH  28 pg (28-32)   02/27/20  05:47    


 


MCHC  34 % (32-34)   02/27/20  05:47    


 


RDW  15.7 % (13.2-15.2)  H  02/27/20  05:47    


 


Plt Count  240 K/mm3 (140-440)   02/27/20  05:47    


 


Lymph % (Auto)  27.5 % (13.4-35.0)   02/27/20  05:47    


 


Mono % (Auto)  5.8 % (0.0-7.3)   02/27/20  05:47    


 


Eos % (Auto)  5.2 % (0.0-4.3)  H  02/27/20  05:47    


 


Baso % (Auto)  0.8 % (0.0-1.8)   02/27/20  05:47    


 


Lymph #  1.6 K/mm3 (1.2-5.4)   02/27/20  05:47    


 


Mono #  0.3 K/mm3 (0.0-0.8)   02/27/20  05:47    


 


Eos #  0.3 K/mm3 (0.0-0.4)   02/27/20  05:47    


 


Baso #  0.0 K/mm3 (0.0-0.1)   02/27/20  05:47    


 


Seg Neutrophils %  60.7 % (40.0-70.0)   02/27/20  05:47    


 


Seg Neutrophils #  3.5 K/mm3 (1.8-7.7)   02/27/20  05:47    


 


Sodium  145 mmol/L (137-145)   02/27/20  05:47    


 


Potassium  3.9 mmol/L (3.6-5.0)   02/27/20  05:47    


 


Chloride  103.3 mmol/L ()   02/27/20  05:47    


 


Carbon Dioxide  26 mmol/L (22-30)   02/27/20  05:47    


 


Anion Gap  20 mmol/L  02/27/20  05:47    


 


BUN  17 mg/dL (9-20)   02/27/20  05:47    


 


Creatinine  1.1 mg/dL (0.8-1.5)   02/27/20  05:47    


 


Estimated GFR  > 60 ml/min  02/27/20  05:47    


 


BUN/Creatinine Ratio  15 %  02/27/20  05:47    


 


Glucose  188 mg/dL ()  H  02/27/20  05:47    


 


Calcium  9.3 mg/dL (8.4-10.2)   02/27/20  05:47    


 


Total Creatine Kinase  252 units/L ()  H  02/27/20  07:18    


 


CK-MB (CK-2)  3.5 ng/mL (0.0-4.0)   02/27/20  07:18    


 


CK-MB (CK-2) Rel Index  1.3  (0-4)   02/27/20  07:18    


 


Troponin T  < 0.010 ng/mL (0.00-0.029)   02/27/20  07:18    


 


NT-Pro-B Natriuret Pep  611.6 pg/mL (0-450)  H  02/26/20  22:49    














Active Medications





- Current Medications


Current Medications: 














Generic Name Dose Route Start Last Admin





  Trade Name Freq  PRN Reason Stop Dose Admin


 


Acetaminophen  650 mg  02/27/20 01:03 





  Tylenol  PO  





  Q4H PRN  





  Pain MILD(1-3)/Fever >100.5/HA  


 


Aspirin  81 mg  02/27/20 10:00  02/28/20 11:10





  Baby Aspirin  PO   81 mg





  QDAY JOSE GUADALUPE   Administration


 


Carvedilol  6.25 mg  02/27/20 10:00  02/28/20 11:11





  Coreg  PO   6.25 mg





  BID JOSE GUADALUPE   Administration


 


Enoxaparin Sodium  40 mg  02/27/20 10:00  02/28/20 11:10





  Enoxaparin  SUB-Q   40 mg





  QDAY JOSE GUADALUPE   Administration


 


Furosemide  40 mg  02/27/20 06:00  02/28/20 06:37





  Lasix  IV   40 mg





  BID@0600,1800 JOSE GUADALUPE   Administration


 


Nifedipine  60 mg  02/27/20 22:00  02/28/20 11:11





  Procardia Xl  PO   60 mg





  Q12HR JOSE GUADALUPE   Administration


 


Ondansetron HCl  4 mg  02/27/20 01:03 





  Zofran  IV  





  Q8H PRN  





  Nausea And Vomiting  


 


Oxycodone/Acetaminophen  1 tab  02/27/20 01:03 





  Percocet 5/325  PO  





  Q6H PRN  





  Pain, Moderate (4-6)  


 


Sodium Chloride  10 ml  02/27/20 10:00  02/28/20 11:12





  Sodium Chloride Flush Syringe 10 Ml  IV   10 ml





  BID JOSE GUADALUPE   Administration


 


Sodium Chloride  10 ml  02/27/20 01:03 





  Sodium Chloride Flush Syringe 10 Ml  IV  





  PRN PRN  





  LINE FLUSH  


 


Spironolactone  25 mg  02/27/20 20:00  02/28/20 11:11





  Aldactone  PO   25 mg





  QDAY JOSE GUADALUPE   Administration


 


Valsartan  160 mg  02/27/20 22:00  02/28/20 11:11





  Diovan  PO   160 mg





  BID JOSE GUADALUPE   Administration














Nutrition/Malnutrition Assess





- Dietary Evaluation


Nutrition/Malnutrition Findings: 


                                        





Nutrition Notes                                            Start:  02/27/20 

15:25


Freq:                                                      Status: Active       




Protocol:                                                                       




 Document     02/27/20 15:25  LM  (Rec: 02/27/20 15:34  LM  [id number])


 Nutrition Notes


     Need for Assessment generated from:         MD Order,Education


     Initial or Follow up                        Brief Note


     Current Diagnosis                           Hypertension,Heart Failure


     Other Pertinent Diagnosis                   SOB


     Current Diet                                Cardiac


     Subjective/Other Information                MD consult for diet education.


                                                 Pt with a good appetite.


                                                 Discussed low sodium food


                                                 options and label reading.


                                                 Encouraged pt to decrease rice


                                                 portions and choose whole


                                                 grains/brown rice in place of


                                                 white rice, lean meats and


                                                 fish, vegetables, and healthy


                                                 fats (olive oil in place of


                                                 butter, salmon) Discussed


                                                 hidden sources of sodium (


                                                 canned foods, restaurant foods


                                                 , sauces). Pt stated that he


                                                 stays away from fried food and


                                                 sodas. Encouraged pt to


                                                 continue these habits.


     #1


      Nutrition Diagnosis                        Food and nutrition-related


                                                 knowledge deficit


      Etiology                                   No prior hypertension/heart


                                                 healthy diet education


      As Evidenced by Signs and Symptoms         pt unaware of various sodium


                                                 sources, pt needing education


 Nutrition Intervention


     Teaching Recipient                          Patient


     Learning Readiness                          Good


     Teaching Methods                            Discussion,Handout


     Response to Teaching                        Verbalize understanding


     Education Handouts Provided                 Hypertension


     Barriers to Learning                        No Barriers


     RD phone number provided                    Yes


     Patient aware of follow up options          Yes


     Revisit per MD consult or patient           Sign Off


      request:

## 2020-02-28 NOTE — TREADMILL REPORT
INDICATION:  Cardiomyopathy.



ORDERING PHYSICIAN:  Igor Elliott MD



FINDINGS:  The left ventricular cavity is severely dilated.  The left

ventricular ejection fraction is calculated at 20% with evidence of severe

global left ventricular hypokinesis.  There is no scintigraphic evidence of

myocardial ischemia.  There is evidence of a fixed inferior wall defect due to

overlying diaphragmatic attenuation.  Findings are consistent with nonischemic

cardiomyopathy.



IMPRESSION:

1.  Severely dilated left ventricular cavity with severe global left ventricular

hypokinesis.

2.  No scintigraphic evidence of myocardial ischemia, findings consistent with

nonischemic cardiomyopathy.

3.  Diaphragmatic attenuation noted.





DD: 02/28/2020 11:49

DT: 02/28/2020 14:55

JOB# [id number]  [id number]

DEVAN/KAYLA

## 2020-02-29 NOTE — DISCHARGE SUMMARY
Providers





- Providers


Date of Admission: 


02/27/20 00:45





Date of discharge: 02/29/20


Attending physician: 


NATY ARELLANO





                                        





02/27/20 01:03


Consult to Physician [CONS] Routine 


   Comment: 


   Consulting Provider: NATY FRIED


   Physician Instructions: 


   Reason For Exam: chf





02/27/20 01:06


Consult to Dietitian/Nutrition [CONS] Routine 


   Physician Instructions: 


   Reason For Exam: 


   Reason for Consult: Patient on ventilator


   Reason for Consult: Diet education











Primary care physician: 


PRIMARY CARE MD








Hospitalization


Condition: Fair


Disposition: DC-01 TO HOME OR SELFCARE





Core Measure Documentation





- Palliative Care


Palliative Care/ Comfort Measures: Not Applicable





- Core Measures


Any of the following diagnoses?: heart failure





- Heart Failure Discharge Requirements


ACE/ARB for LVSD if EF <40%: Yes


Beta blocker at discharge: Yes





Exam





- Constitutional


Vitals: 


                                        











Temp Pulse Resp BP Pulse Ox


 


 98.2 F   83   18   151/94   96 


 


 02/29/20 08:21  02/29/20 09:26  02/29/20 08:21  02/29/20 09:26  02/29/20 08:21














Plan


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


Diet: low fat, low cholesterol, low salt


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

ardiology)


Plan of Treatment: 


1.Follow up with PCP or [facility] in 1 week.


2.Follow up with Dr. Grajeda in 1 week


Follow up with: 


PRIMARY CARE,MD [Primary Care Provider] - 3-5 Days


Prescriptions: 


Spironolactone [Aldactone] 25 mg PO QDAY #30 tablet


carvediloL [Coreg] 6.25 mg PO BID #60 tablet


Valsartan [Diovan] 160 mg PO BID #60 tablet


NIFEdipine XL [Procardia Xl] 60 mg PO Q12HR #60 tablet

## 2020-02-29 NOTE — PROGRESS NOTE
Assessment and Plan





Acute systolic heart failure resolved.


Non-ischemic cardiomyopathy


   MPI this admission showing no ischemia, severe LVE, LVEF 20%


Essential primary hypertension


Morbid obesity


Non-compliance


Nicotine dependence





Tele showing sinus rhythm with no arrhythmias





Recommendations:





Continue GDMT


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 


Patient stable to be discharged home from cardiac standpoint


Patient will be discharged on all his current medications including 40 mg of 

p.o. Lasix every day








Subjective


Date of service: 02/29/20


Principal diagnosis: CHF


Interval history: 





No significant events overnight





Objective


                                   Vital Signs











  Temp Pulse Pulse Resp BP Pulse Ox


 


 02/29/20 09:26   83    151/94 


 


 02/29/20 09:25   83    151/94 


 


 02/29/20 08:21  98.2 F  78   18  135/86  96


 


 02/29/20 07:46    79  15   97


 


 02/29/20 04:50  98.2 F  81   20  137/88  94


 


 02/28/20 23:59  98.5 F  81   22  125/72  95


 


 02/28/20 22:59   91 H    


 


 02/28/20 22:12      146/99 


 


 02/28/20 20:52  98.4 F  89   18  148/98  97


 


 02/28/20 20:29   88    148/98  96


 


 02/28/20 20:10       95


 


 02/28/20 18:46   84    161/100  96


 


 02/28/20 11:54  98.0 F  91 H   18  136/93  93


 


 02/28/20 11:11   87    153/101 














- Physical Examination


HEENT: Positive: PERRL, Normocephaly, Other


Neck: Positive: neck supple


Cardiac: Positive: Reg Rate and Rhythm


Lungs: Positive: clear to auscultation


Neuro: Positive: Grossly Intact


Abdomen: Positive: Soft


Skin: Positive: Clear


Extremities: Present: normal.  Absent: edema





- Imaging and Cardiology


EKG: image reviewed

## 2022-06-19 ENCOUNTER — HOSPITAL ENCOUNTER (EMERGENCY)
Dept: HOSPITAL 5 - ED | Age: 47
LOS: 1 days | Discharge: LEFT BEFORE DISCHARGE | End: 2022-06-20
Payer: SELF-PAY

## 2022-06-19 DIAGNOSIS — M54.9: Primary | ICD-10-CM

## 2022-06-19 DIAGNOSIS — Z53.21: ICD-10-CM
